# Patient Record
Sex: MALE | Race: WHITE | Employment: FULL TIME | URBAN - METROPOLITAN AREA
[De-identification: names, ages, dates, MRNs, and addresses within clinical notes are randomized per-mention and may not be internally consistent; named-entity substitution may affect disease eponyms.]

---
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
## 2023-03-06 ENCOUNTER — OFFICE VISIT (OUTPATIENT)
Dept: OCCUPATIONAL MEDICINE | Age: 18
End: 2023-03-06

## 2023-03-06 VITALS
TEMPERATURE: 99 F | OXYGEN SATURATION: 99 % | RESPIRATION RATE: 18 BRPM | DIASTOLIC BLOOD PRESSURE: 82 MMHG | SYSTOLIC BLOOD PRESSURE: 140 MMHG | HEART RATE: 80 BPM

## 2023-03-06 DIAGNOSIS — R11.2 NAUSEA AND VOMITING, UNSPECIFIED VOMITING TYPE: ICD-10-CM

## 2023-03-06 DIAGNOSIS — R50.9 FEVER, UNSPECIFIED FEVER CAUSE: Primary | ICD-10-CM

## 2023-03-06 LAB — SARS-COV-2, POC: NORMAL

## 2023-03-06 ASSESSMENT — ENCOUNTER SYMPTOMS
COUGH: 1
SINUS PAIN: 0
VOMITING: 1
NAUSEA: 1
ABDOMINAL PAIN: 0
SHORTNESS OF BREATH: 0
DIARRHEA: 0
RHINORRHEA: 0
SORE THROAT: 0

## 2023-03-06 NOTE — PROGRESS NOTES
[facility]  [address]  [phone]    SUBJECTIVE:     Nathan Flores is a 21 y.o. 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  Last meal prior to s/s starting was: last night at 7pm (pizza). Student reports vomiting several times. Last episode was at 7am this morning. Denies diarrhea   Today, student has not eaten/drank anything  Student has treated s/s with:  dayquil and ibuprofen. No current outpatient medications on file. No Known Allergies    Review of Systems   Constitutional:  Positive for chills and fever. HENT:  Negative for congestion, ear pain, postnasal drip, rhinorrhea, sinus pain and sore throat. Respiratory:  Positive for cough. Negative for shortness of breath. Cardiovascular:  Negative for chest pain. Gastrointestinal:  Positive for nausea and vomiting. Negative for abdominal pain and diarrhea. OBJECTIVE:    BP (!) 140/82 (Site: Right Upper Arm, Position: Sitting)   Pulse 80   Temp 99 °F (37.2 °C) (Temporal)   Resp 18   SpO2 99%      Physical Exam  Vitals reviewed. Constitutional:       Appearance: Normal appearance. HENT:      Head: Normocephalic and atraumatic. Right Ear: Hearing, tympanic membrane and ear canal normal.      Left Ear: Hearing, tympanic membrane and ear canal normal.      Nose: Nose normal.      Mouth/Throat:      Lips: Pink. Mouth: Mucous membranes are moist.      Pharynx: Uvula midline. No oropharyngeal exudate or posterior oropharyngeal erythema. Cardiovascular:      Rate and Rhythm: Normal rate and regular rhythm. Pulmonary:      Effort: Pulmonary effort is normal.      Breath sounds: Normal breath sounds. Abdominal:      General: Abdomen is flat. Bowel sounds are normal.      Palpations: Abdomen is soft. Tenderness: There is no abdominal tenderness.    Neurological:      Mental Status: He is alert and oriented to person, place, and time. Psychiatric:         Mood and Affect: Mood normal.         Behavior: Behavior normal.        Results for orders placed or performed in visit on 03/06/23   AMB POC SARS-COV-2   Result Value Ref Range    SARS-COV-2, POC Not-Detected Not Detected          ASSESSMENT and PLAN         Diagnosis Orders   1. Fever, unspecified fever cause  AMB POC SARS-COV-2      2. Nausea and vomiting, unspecified vomiting type  AMB POC SARS-COV-2            Recommendations: suggested OTC Pepto chewables per package instructions for mild-moderate GI symptoms. Advised student to change their diet to [employer] until s/s fully resolve then add 1 additional day of BRAT diet and slowly progress to regular diet as tolerated. Drink small, frequent sips of oral fluids (water and 50-50% diluted gatorage/pedialyte with water) and BRAT diet of bland, nonspicy, scantly seasoned foods until symptoms resolve (saltine crackers, pretzels, toasted bread w/ butter, white rice, applesauce, etc) then encouraged probiotic use to restore normal gut du. Encouraged good hand hygiene and disinfection techniques. Provided note for school/work. Follow up: Patient was encouraged to return to the clinic and/or PCP if s/s persist or do not resolve completely. Also advised to seek emergent care if new or worsening signs and symptoms which warrant immediate evaluation including, but not limited to: headache, vision changes, speech disturbance, difficulty with ambulation/gait, numbness, tingling, weakness, fever (100.4 or higher), syncope, abdominal pain, chest pain, or shortness of breath. *Counseling provided on benefits of having a primary care provider which includes, but is not limited to, continuity of care and having a medical home when routine and emergent health needs arise. Also reminded patient that onsite clinic policy states that we are not to take the place of a primary care provider. Patient verbalized understanding. *Side effects, adverse effects, risks versus benefits associated with medications prescribed/recommended were discussed with the patient. Patient verbalized understanding and agrees with treatment plan. All questions answered. * I have reviewed the patient's medication list, past medical, family, social, and surgical history and updated the patient record appropriately        Social History     Socioeconomic History    Marital status: Unknown     Spouse name: Not on file    Number of children: Not on file    Years of education: Not on file    Highest education level: Not on file   Occupational History    Not on file   Tobacco Use    Smoking status: Never    Smokeless tobacco: Never   Substance and Sexual Activity    Alcohol use: Never    Drug use: Never    Sexual activity: Never   Other Topics Concern    Not on file   Social History Narrative    Not on file     Social Determinants of Health     Financial Resource Strain: Not on file   Food Insecurity: Not on file   Transportation Needs: Not on file   Physical Activity: Not on file   Stress: Not on file   Social Connections: Not on file   Intimate Partner Violence: Not on file   Housing Stability: Not on file     Past Medical History:   Diagnosis Date    Allergic rhinitis     Childhood asthma     no flare up since 7 yrs old    History of COVID-19     10/2020     No past surgical history on file. Family History   Problem Relation Age of Onset    Heart Disease Paternal Grandfather     Cancer Paternal Grandfather      There are no Patient Instructions on file for this visit.